# Patient Record
Sex: MALE | Race: BLACK OR AFRICAN AMERICAN | NOT HISPANIC OR LATINO | Employment: OTHER | URBAN - METROPOLITAN AREA
[De-identification: names, ages, dates, MRNs, and addresses within clinical notes are randomized per-mention and may not be internally consistent; named-entity substitution may affect disease eponyms.]

---

## 2017-10-16 ENCOUNTER — OCCUPATIONAL MEDICINE (OUTPATIENT)
Dept: URGENT CARE | Facility: CLINIC | Age: 37
End: 2017-10-16
Payer: COMMERCIAL

## 2017-10-16 VITALS
TEMPERATURE: 98.1 F | WEIGHT: 165 LBS | HEART RATE: 70 BPM | OXYGEN SATURATION: 97 % | DIASTOLIC BLOOD PRESSURE: 98 MMHG | SYSTOLIC BLOOD PRESSURE: 140 MMHG | HEIGHT: 67 IN | RESPIRATION RATE: 16 BRPM | BODY MASS INDEX: 25.9 KG/M2

## 2017-10-16 DIAGNOSIS — S01.81XA LACERATION OF FOREHEAD, INITIAL ENCOUNTER: ICD-10-CM

## 2017-10-16 PROCEDURE — 12011 RPR F/E/E/N/L/M 2.5 CM/<: CPT | Performed by: PHYSICIAN ASSISTANT

## 2017-10-16 ASSESSMENT — ENCOUNTER SYMPTOMS
PALPITATIONS: 0
FEVER: 0
COUGH: 0
SHORTNESS OF BREATH: 0

## 2017-10-16 NOTE — LETTER
"EMPLOYEE’S CLAIM FOR COMPENSATION/ REPORT OF INITIAL TREATMENT  FORM C-4    EMPLOYEE’S CLAIM - PROVIDE ALL INFORMATION REQUESTED   First Name  Carolann Last Name  Darrell Birthdate                    1980                Sex  male Claim Number   Home Address  [address] Age  36 y.o. 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 (if applicable)  Opening aircraft landing gear door     How did this injury or occupational disease occur? (Be specific an answer in detail. Use additional sheet if necessary)  While using a wrench to break the lock on the doo r the wrench pooped off the loch and hit my head    If you believe that you have an occupational disease, when did you first have knowledge of the disability and it relationship to your employment?  N/A  Witnesses to the Accident  Bj Smyth,Graham Crawley       Nature of Injury or Occupational Disease  Defer  Part(s) of Body Injured or Affected  Skull, Facial Bones, Defer    I certify that the above is true and correct to the best of my knowledge and that I have provided this " information in order to obtain the benefits of [place]  I hereby authorize any physician, chiropractor, surgeon, practitioner, or other person, any hospital, including [facility] or [facility], any medical service organization, any insurance company, or other institution or organization to release to each other, any medical or other information, including benefits paid or payable, pertinent to this injury or disease, except information relative to diagnosis, treatment and/or counseling for AIDS, psychological conditions, alcohol or controlled substances, for which I must give specific authorization.  A Photostat of this authorization shall be as valid as the original.     Date   Place   Employee’s Signature   THIS REPORT MUST BE COMPLETED AND MAILED WITHIN 3 WORKING DAYS OF TREATMENT   Place  [facility]  Name of [facility]   Date  10/16/2017 Diagnosis  (S01.81XA) Laceration of forehead, initial encounter Is there evidence the injured employee was under the influence of alcohol and/or another controlled substance at the time of accident?   Hour  3:42 PM Description of Injury or Disease  The encounter diagnosis was Laceration of forehead, initial encounter. No   Treatment  Skin Glue    Have you advised the patient to remain off work five days or more? No   X-Ray Findings      If Yes   From Date  To Date      From information given by the employee, together with medical evidence, can you directly connect this injury or occupational disease as job incurred?  Yes If No Full Duty  Yes Modified Duty      Is additional medical care by a physician indicated?  No If Modified Duty, Specify any Limitations / Restrictions      Do you know of any previous injury or disease contributing to this condition or occupational disease?                            No   Date  10/16/2017 Print  "Doctor’s Name Poonam ANNShay Carson P.A.-C. I certify the employer’s copy of  this form was mailed on:   Address  [address] Insurer’s Use Only     [place] Zip  [address]    Provider’s Tax ID Number  [id number] Telephone  Dept: [phone]          POONAM CARSON P.A.-C.   e-Signature: Dr. Ike Diallo, Medical Director Degree  PATRESSA        ORIGINAL-TREATING PHYSICIAN OR CHIROPRACTOR    PAGE 2-INSURER/TPA    PAGE 3-EMPLOYER    PAGE 4-EMPLOYEE             Form C-4 (rev10/07)              BRIEF DESCRIPTION OF RIGHTS AND BENEFITS  (Pursuant to [place] 616C.[place])    Notice of Injury or Occupational Disease (Incident Report Form C-1): If an injury or occupational disease (OD) arises out of and in the  course of employment, you must provide written notice to your employer as soon as practicable, but no later than 7 days after the accident or  OD. Your employer shall maintain a sufficient supply of the required forms.    Claim for Compensation (Form C-4): If medical treatment is sought, the form C-4 is available at the place of initial treatment. A completed  \"Claim for Compensation\" (Form C-4) must be filed within 90 days after an accident or OD. The treating physician or chiropractor must,  within 3 working days after treatment, complete and mail to the employer, the employer's insurer and third-party , the Claim for  Compensation.    Medical Treatment: If you require medical treatment for your on-the-job injury or OD, you may be required to select a physician or  chiropractor from a list provided by your workers’ compensation insurer, if it has contracted with an Organization for Managed Care (MCO) or  Preferred Provider Organization (PPO) or providers of health care. If your employer has not entered into a contract with an MCO or PPO, you  may select a physician or chiropractor from the Panel of Physicians and Chiropractors. Any medical costs related to your industrial injury or  OD " will be paid by your insurer.    Temporary Total Disability (TTD): If your doctor has certified that you are unable to work for a period of at least 5 consecutive days, or 5  cumulative days in a 20-day period, or places restrictions on you that your employer does not accommodate, you may be entitled to TTD  compensation.    Temporary Partial Disability (TPD): If the wage you receive upon reemployment is less than the compensation for TTD to which you are  entitled, the insurer may be required to pay you TPD compensation to make up the difference. TPD can only be paid for a maximum of 24  months.    Permanent Partial Disability (PPD): When your medical condition is stable and there is an indication of a PPD as a result of your injury or  OD, within 30 days, your insurer must arrange for an evaluation by a rating physician or chiropractor to determine the degree of your PPD. The  amount of your PPD award depends on the date of injury, the results of the PPD evaluation and your age and wage.    Permanent Total Disability (PTD): If you are medically certified by a treating physician or chiropractor as permanently and totally disabled  and have been granted a PTD status by your insurer, you are entitled to receive monthly benefits not to exceed 66 2/3% of your average  monthly wage. The amount of your PTD payments is subject to reduction if you previously received a PPD award.    Vocational Rehabilitation Services: You may be eligible for vocational rehabilitation services if you are unable to return to the job due to a  permanent physical impairment or permanent restrictions as a result of your injury or occupational disease.    Transportation and Per Jennifer Reimbursement: You may be eligible for travel expenses and per jennifer associated with medical treatment.    Reopening: You may be able to reopen your claim if your condition worsens after claim closure.    Appeal Process: If you disagree with a written determination  issued by the insurer or the insurer does not respond to your request, you may  appeal to the Department of Administration, , by following the instructions contained in your determination letter. You must  appeal the determination within 70 days from the date of the determination letter at [address], [address], [place], [place]  [address], or [address], [place], [place] [address]. If you disagree with the  decision, you may appeal to the  Department of Administration, . You must file your appeal within 30 days from the date of the  decision  letter at [address], [address], [place], [place] [address], or [address], [place], [place] [address]. If you  disagree with a decision of an , you may file a petition for judicial review with the District Court. You must do so within 30  days of the Appeal Officer’s decision. You may be represented by an  at your own expense or you may contact the [facility] for possible  representation.    [place]  for Injured Workers (NAIW): If you disagree with a  decision, you may request that NAIW represent you  without charge at an  Hearing. For information regarding denial of benefits, you may contact the [facility] at: [address], [address], [place] [address], [phone], or [address], [address], [place] [address], [phone]    To File a Complaint with the Division: If you wish to file a complaint with the  of the Division of Industrial Relations (DIR),  please contact the Workers’ Compensation Section, [address], [address], [place], [place] [address], telephone (534) [phone], or  [address], [place] [address], telephone [phone].    For assistance with Workers’ Compensation Issues: you may contact the Office of the  [facility] Consumer Health Assistance, [address], [address], [place] [address], Toll Free 1-312.918.1912, Web site: http://govcha.Critical access hospital.nv., E-mail  [email or web address] access [email or web address].                                                                                                                                                                                                                                   __________________________________________________________________                                                                   _________________                Employee Name / Signature                                                                                                                                                       Date                                                                                                                                                                                                     D-2 (rev. 10/07)

## 2017-10-16 NOTE — PATIENT INSTRUCTIONS
Tissue Adhesive Wound Care  Some cuts, wounds, lacerations, and incisions can be repaired by using tissue adhesive. Tissue adhesive is like glue. It holds the skin together, allowing for faster healing. It forms a strong bond on the skin in about 1 minute and reaches its full strength in about 2 or 3 minutes. The adhesive disappears naturally while the wound is healing. It is important to take proper care of your wound at home while it heals.   HOME CARE INSTRUCTIONS   · Showers are allowed. Do not soak the area containing the tissue adhesive. Do not take baths, swim, or use hot tubs. Do not use any soaps or ointments on the wound. Certain ointments can weaken the glue.  · If a bandage (dressing) has been applied, follow your health care provider's instructions for how often to change the dressing.    · Keep the dressing dry if one has been applied.    · Do not scratch, pick, or rub the adhesive.    · Do not place tape over the adhesive. The adhesive could come off when pulling the tape off.    · Protect the wound from further injury until it is healed.    · Protect the wound from sun and tanning bed exposure while it is healing and for several weeks after healing.    · Only take over-the-counter or prescription medicines as directed by your health care provider.    · Keep all follow-up appointments as directed by your health care provider.  SEEK IMMEDIATE MEDICAL CARE IF:   · Your wound becomes red, swollen, hot, or tender.    · You develop a rash after the glue is applied.  · You have increasing pain in the wound.    · You have a red streak that goes away from the wound.    · You have pus coming from the wound.    · You have increased bleeding.  · You have a fever.  · You have shaking chills.    · You notice a bad smell coming from the wound.    · Your wound or adhesive breaks open.    MAKE SURE YOU:   · Understand these instructions.  · Will watch your condition.  · Will get help right away if you are not doing  well or get worse.     This information is not intended to replace advice given to you by your health care provider. Make sure you discuss any questions you have with your health care provider.     Document Released: 06/13/2002 Document Revised: 10/08/2014 Document Reviewed: 07/09/2014  Elsevier Interactive Patient Education ©2016 Elsevier Inc.

## 2017-10-17 NOTE — PROGRESS NOTES
"Subjective:      Carolann Ramírez is a 36 y.o. male who presents with Head Injury (hit head with wrench/ cut on right eyebrow)          HPI      Date of injury 10/16/2017. Patient was working on airplane when the wrench he was using torqued off and hit him in the side of the right eyebrow. Copious notes of blood and eventually was able to bleeding he denies nausea, vomiting, blurry vision, loss of consciousness. This is only job. No previous injuries.     Review of Systems   Constitutional: Negative for fever and malaise/fatigue.   Respiratory: Negative for cough and shortness of breath.    Cardiovascular: Negative for chest pain and palpitations.   Skin:        Laceration on forehead.     All other systems reviewed and are negative.    PMH:  has no past medical history on file.  MEDS: No current outpatient prescriptions on file.  ALLERGIES: No Known Allergies  SURGHX: No past surgical history on file.  SOCHX:    FH: Family history was reviewed, no pertinent findings to report  Medications, Allergies, and current problem list reviewed today in Epic       Objective:     /98   Pulse 70   Temp 36.7 °C (98.1 °F)   Resp 16   Ht 1.702 m (5' 7\")   Wt 74.8 kg (165 lb)   SpO2 97%   BMI 25.84 kg/m²      Physical Exam   Constitutional: He appears well-developed and well-nourished.   HENT:   Head:       Cardiovascular: Normal rate, regular rhythm and normal heart sounds.    Pulmonary/Chest: Effort normal and breath sounds normal.   Skin: Skin is warm and dry.   Psychiatric: He has a normal mood and affect. His behavior is normal. Judgment and thought content normal.   Vitals reviewed.         Assessment/Plan:    Date of injury 10/16/2017. Patient was working on airplane when the wrench he was using torqued off and hit him in the side of the right eyebrow. Copious notes of blood and eventually was able to bleeding he denies nausea, vomiting, blurry vision, loss of consciousness. This is only job. No previous " injuries.  There is a small 1 cm laceration on the side of the eyebrow. No bleeding currently. Discussed options of suture versus skin glue. Patient declined sutures and will trial of skin glue.    1. Laceration of forehead, initial encounter  - Skin Glue  - D 39  - Discharge (MMI)

## 2024-06-13 NOTE — LETTER
[address] [place] [address]  Phone:  [phone] - Fax:  [phone]   [facility] Progress Report and Disability Certification  Date of Service: 10/16/2017   No Show:  No  Date / Time of Next Visit:  MMI   Claim Information   Patient Name: Carolann Ramírez  Claim Number:     Employer:  [employer]  Date of Injury: 10/16/2017     Insurer / TPA: [facility] [employer]  ID / SSN:     Occupation: Avionics Tech   Diagnosis: The encounter diagnosis was Laceration of forehead, initial encounter.    Medical Information   Related to Industrial Injury?      Subjective Complaints:   Date of injury 10/16/2017. Patient was working on airplane when the wrench he was using torqued off and hit him in the side of the right eyebrow. Copious notes of blood and eventually was able to bleeding he denies nausea, vomiting, blurry vision, loss of consciousness. This is only job. No previous injuries.   Objective Findings: There is a small 1 cm laceration on the side of the eyebrow. No bleeding currently. Discussed options of suture versus skin glue. Patient declined sutures and will trial of skin glue.   Pre-Existing Condition(s): NONE   Assessment:   Initial Visit    Status: Discharged /  MMI  Permanent Disability:     Plan:      Diagnostics:      Comments:       Disability Information   Status: Released to Full Duty    From:     Through:   Restrictions are:     Physical Restrictions   Sitting:    Standing:    Stooping:    Bending:      Squatting:    Walking:    Climbing:    Pushing:      Pulling:    Other:    Reaching Above Shoulder (L):   Reaching Above Shoulder (R):       Reaching Below Shoulder (L):    Reaching Below Shoulder (R):      Not to exceed Weight Limits   Carrying(hrs):   Weight Limit(lb):   Lifting(hrs):   Weight  Limit(lb):     Comments:      Repetitive Actions   Hands: i.e. Fine Manipulations from Grasping:     Feet: i.e. Operating Foot Controls:      Entered patients chart to verify PCP for ambulatory referral to IM/FM     Driving / Operate Machinery:     Physician Name: Noel Carson P.A.-C. Physician Signature:   NOEL CARSON P.A.-C. e-Signature: Dr. Ike Diallo, Medical Director   Clinic Name / Location: [address]  [place] [address] Clinic Phone Number: Dept: [phone]   Appointment Time: 3:30 Pm Visit Start Time: 3:42 PM   Check-In Time:  3:29 Pm Visit Discharge Time:  4:12pm   Original-Treating Physician or Chiropractor    Page 2-Insurer/TPA    Page 3-Employer    Page 4-Employee